# Patient Record
Sex: MALE | Race: WHITE | NOT HISPANIC OR LATINO | Employment: OTHER | URBAN - METROPOLITAN AREA
[De-identification: names, ages, dates, MRNs, and addresses within clinical notes are randomized per-mention and may not be internally consistent; named-entity substitution may affect disease eponyms.]

---

## 2023-10-24 ENCOUNTER — TELEPHONE (OUTPATIENT)
Dept: SURGICAL ONCOLOGY | Facility: CLINIC | Age: 73
End: 2023-10-24
Payer: MEDICARE

## 2023-10-24 NOTE — TELEPHONE ENCOUNTER
Called Mr. Louis to remind him that he is due in November for an MRI for pancreatic cyst surveillance. I asked him to call my office to discuss details.     Ana Paula Fish PA-C  Surgical Oncology

## 2023-11-10 ENCOUNTER — TELEPHONE (OUTPATIENT)
Dept: SURGICAL ONCOLOGY | Facility: CLINIC | Age: 73
End: 2023-11-10
Payer: MEDICARE

## 2023-11-10 NOTE — TELEPHONE ENCOUNTER
Mr. Louis returned my call. He will be having his surveillance MRI at [facility], ordered through Dr. Cee, on 12/14. I will set a reminder and request the images once available.     Ana Paula Fish PA-C  Surgical Oncology